# Patient Record
Sex: MALE | Race: WHITE | Employment: OTHER | URBAN - METROPOLITAN AREA
[De-identification: names, ages, dates, MRNs, and addresses within clinical notes are randomized per-mention and may not be internally consistent; named-entity substitution may affect disease eponyms.]

---

## 2017-08-17 PROCEDURE — 81001 URINALYSIS AUTO W/SCOPE: CPT | Performed by: FAMILY MEDICINE

## 2017-08-17 PROCEDURE — 84154 ASSAY OF PSA FREE: CPT | Performed by: FAMILY MEDICINE

## 2017-08-17 PROCEDURE — 87086 URINE CULTURE/COLONY COUNT: CPT | Performed by: FAMILY MEDICINE

## 2017-08-17 PROCEDURE — 84153 ASSAY OF PSA TOTAL: CPT | Performed by: FAMILY MEDICINE

## 2017-08-29 PROBLEM — M51.27 LUMBOSACRAL DISC HERNIATION: Status: ACTIVE | Noted: 2017-08-29

## 2019-04-16 PROBLEM — Z28.21 REFUSED INFLUENZA VACCINE: Status: ACTIVE | Noted: 2019-04-16

## 2019-04-16 PROBLEM — Z28.21 REFUSED PNEUMOCOCCAL VACCINATION: Status: ACTIVE | Noted: 2019-04-16

## 2020-04-08 ENCOUNTER — APPOINTMENT (OUTPATIENT)
Dept: MRI IMAGING | Facility: HOSPITAL | Age: 80
End: 2020-04-08
Attending: EMERGENCY MEDICINE
Payer: MEDICARE

## 2020-04-08 ENCOUNTER — HOSPITAL ENCOUNTER (EMERGENCY)
Facility: HOSPITAL | Age: 80
Discharge: HOME | End: 2020-04-08
Attending: EMERGENCY MEDICINE
Payer: MEDICARE

## 2020-04-08 VITALS
SYSTOLIC BLOOD PRESSURE: 129 MMHG | HEIGHT: 69 IN | TEMPERATURE: 98 F | HEART RATE: 58 BPM | WEIGHT: 155 LBS | BODY MASS INDEX: 22.96 KG/M2 | DIASTOLIC BLOOD PRESSURE: 71 MMHG | RESPIRATION RATE: 16 BRPM | OXYGEN SATURATION: 96 %

## 2020-04-08 DIAGNOSIS — H81.399 PERIPHERAL VERTIGO, UNSPECIFIED LATERALITY: Primary | ICD-10-CM

## 2020-04-08 PROCEDURE — 85025 COMPLETE CBC W/AUTO DIFF WBC: CPT | Performed by: EMERGENCY MEDICINE

## 2020-04-08 PROCEDURE — 36415 COLL VENOUS BLD VENIPUNCTURE: CPT

## 2020-04-08 PROCEDURE — 70551 MRI BRAIN STEM W/O DYE: CPT | Performed by: EMERGENCY MEDICINE

## 2020-04-08 PROCEDURE — 93005 ELECTROCARDIOGRAM TRACING: CPT

## 2020-04-08 PROCEDURE — 80048 BASIC METABOLIC PNL TOTAL CA: CPT | Performed by: EMERGENCY MEDICINE

## 2020-04-08 PROCEDURE — 93010 ELECTROCARDIOGRAM REPORT: CPT | Performed by: EMERGENCY MEDICINE

## 2020-04-08 PROCEDURE — 84484 ASSAY OF TROPONIN QUANT: CPT | Performed by: EMERGENCY MEDICINE

## 2020-04-08 PROCEDURE — 99284 EMERGENCY DEPT VISIT MOD MDM: CPT

## 2020-04-08 RX ORDER — ONDANSETRON 4 MG/1
4 TABLET, ORALLY DISINTEGRATING ORAL EVERY 4 HOURS PRN
Qty: 10 TABLET | Refills: 0 | Status: SHIPPED | OUTPATIENT
Start: 2020-04-08 | End: 2020-04-08

## 2020-04-08 RX ORDER — MECLIZINE HYDROCHLORIDE 25 MG/1
25 TABLET ORAL ONCE
Status: COMPLETED | OUTPATIENT
Start: 2020-04-08 | End: 2020-04-08

## 2020-04-08 RX ORDER — MECLIZINE HYDROCHLORIDE 25 MG/1
25 TABLET ORAL 3 TIMES DAILY PRN
Qty: 10 TABLET | Refills: 0 | Status: SHIPPED | OUTPATIENT
Start: 2020-04-08 | End: 2020-04-15

## 2020-04-08 RX ORDER — ONDANSETRON 4 MG/1
4 TABLET, ORALLY DISINTEGRATING ORAL ONCE
Status: COMPLETED | OUTPATIENT
Start: 2020-04-08 | End: 2020-04-08

## 2020-04-08 RX ORDER — MECLIZINE HYDROCHLORIDE 25 MG/1
25 TABLET ORAL 3 TIMES DAILY PRN
Qty: 10 TABLET | Refills: 0 | Status: SHIPPED | OUTPATIENT
Start: 2020-04-08 | End: 2020-04-08

## 2020-04-08 RX ORDER — ONDANSETRON 4 MG/1
4 TABLET, FILM COATED ORAL ONCE
Status: DISCONTINUED | OUTPATIENT
Start: 2020-04-08 | End: 2020-04-08

## 2020-04-08 RX ORDER — ONDANSETRON 4 MG/1
4 TABLET, ORALLY DISINTEGRATING ORAL EVERY 4 HOURS PRN
Qty: 10 TABLET | Refills: 0 | Status: SHIPPED | OUTPATIENT
Start: 2020-04-08 | End: 2020-04-15

## 2020-04-08 NOTE — ED PROVIDER NOTES
Patient Seen in: [facility] AND [facility] Emergency Department      History   Patient presents with:  Dizziness    Stated Complaint: dizziness    HPI  39-year-old male with hypertension presenting for evaluation of dizziness.   He has had 2 episodes of the dizzi Tympanic membrane normal.      Left Ear: Tympanic membrane normal.      Nose: Nose normal.      Mouth/Throat:      Mouth: Mucous membranes are moist.      Pharynx: Oropharynx is clear. Neck:      Musculoskeletal: Normal range of motion.    Cardiovascular: axes as noted on EKG Report.   Rate: 64  Rhythm: Sinus Rhythm  Reading: no STEMI, RBBB present with LAFB, no prior for comparison              Mri Brain Wo Acute (3) Sequence (cpt=70551)    Result Date: 4/8/2020  CONCLUSION:  Limited 3-sequence stroke dell (four) hours as needed for Nausea.   Qty: 10 tablet Refills: 0

## 2020-04-08 NOTE — ED INITIAL ASSESSMENT (HPI)
Patient here for dizziness that began yesterday, lessened throughout the day, but came back again [place] morning., States he feels as if he is off balance. Patient alert and oriented with good facial symmetry and hand grasp.  Denies CP or SOB

## 2020-09-11 ENCOUNTER — ORDER TRANSCRIPTION (OUTPATIENT)
Dept: ADMINISTRATIVE | Facility: HOSPITAL | Age: 80
End: 2020-09-11

## 2020-09-11 DIAGNOSIS — R29.898 WEAKNESS OF BOTH UPPER EXTREMITIES: Primary | ICD-10-CM

## 2020-09-29 ENCOUNTER — OFFICE VISIT (OUTPATIENT)
Dept: ELECTROPHYSIOLOGY | Facility: HOSPITAL | Age: 80
End: 2020-09-29
Attending: ORTHOPAEDIC SURGERY
Payer: MEDICARE

## 2020-09-29 DIAGNOSIS — R29.898 WEAKNESS OF BOTH UPPER EXTREMITIES: ICD-10-CM

## 2020-09-29 PROCEDURE — 95912 NRV CNDJ TEST 11-12 STUDIES: CPT | Performed by: OTHER

## 2020-09-29 PROCEDURE — 95885 MUSC TST DONE W/NERV TST LIM: CPT | Performed by: OTHER

## 2020-09-29 PROCEDURE — 95886 MUSC TEST DONE W/N TEST COMP: CPT | Performed by: OTHER

## 2020-09-29 NOTE — PROCEDURES
[employer]  Nerve Conduction & EMG Report                      Brianna Mujica, [address]   EMG department   [facility] [address]  [place] [address] [place] [address]  [phone]          Patient name: Rosa Morrison  Date of bir any radiculopathies. 4.  There is no clear evidence of a myopathy. 5.  There is no clear evidence of a motor neuronopathy at this time. 6.  A neurology consultation is recommended, as well as consideration of repeating a NCV/EMG in 2 to 4 months time.

## 2021-04-06 ENCOUNTER — OFFICE VISIT (OUTPATIENT)
Dept: AUDIOLOGY | Facility: CLINIC | Age: 81
End: 2021-04-06
Payer: COMMERCIAL

## 2021-04-06 ENCOUNTER — OFFICE VISIT (OUTPATIENT)
Dept: OTOLARYNGOLOGY | Facility: CLINIC | Age: 81
End: 2021-04-06
Payer: COMMERCIAL

## 2021-04-06 VITALS — HEIGHT: 68 IN | BODY MASS INDEX: 22.73 KG/M2 | WEIGHT: 150 LBS

## 2021-04-06 DIAGNOSIS — H91.93 BILATERAL HEARING LOSS, UNSPECIFIED HEARING LOSS TYPE: Primary | ICD-10-CM

## 2021-04-06 DIAGNOSIS — H90.A31 MIXED CONDUCTIVE AND SENSORINEURAL HEARING LOSS OF RIGHT EAR WITH RESTRICTED HEARING OF LEFT EAR: Primary | ICD-10-CM

## 2021-04-06 PROCEDURE — 92567 TYMPANOMETRY: CPT | Performed by: AUDIOLOGIST

## 2021-04-06 PROCEDURE — 99203 OFFICE O/P NEW LOW 30 MIN: CPT | Performed by: OTOLARYNGOLOGY

## 2021-04-06 PROCEDURE — 3008F BODY MASS INDEX DOCD: CPT | Performed by: OTOLARYNGOLOGY

## 2021-04-06 PROCEDURE — 92557 COMPREHENSIVE HEARING TEST: CPT | Performed by: AUDIOLOGIST

## 2021-04-06 NOTE — PROGRESS NOTES
AUDIOLOGY REPORT      Shila Mayfield is a 79year old male     Referring Provider: Elaina Cobos   YOB: 1940  Medical Record: [id number]      Patient Hearing History:  Patient reported hearing loss, greater for right ear.    He does not

## 2021-04-08 NOTE — PROGRESS NOTES
Marissa Katz is a 79year old male. Patient presents with:  Hearing Loss: right ear is worse     HPI:   He has been experiencing hearing loss in both of his ears.   This is been going on for the last several years but seems to be getting slowly worse Normal. Cranial nerves - Cranial nerves II through XII grossly intact.    Neck Exam Normal Inspection - Normal. Palpation - Normal. Parotid gland - Normal. Thyroid gland - Normal.   Psychiatric Normal Orientation - Oriented to time, place, person & situatio

## 2021-04-14 PROBLEM — Z28.21 REFUSED PNEUMOCOCCAL VACCINATION: Status: RESOLVED | Noted: 2019-04-16 | Resolved: 2021-04-14

## 2021-04-14 PROBLEM — H35.3131 EARLY STAGE DRY AGE-RELATED MACULAR DEGENERATION OF BOTH EYES: Status: ACTIVE | Noted: 2021-04-14

## 2021-04-14 PROBLEM — H91.93 BILATERAL HEARING LOSS: Status: ACTIVE | Noted: 2021-04-14

## 2021-04-14 PROBLEM — I67.89 CEREBRAL MICROVASCULAR DISEASE: Status: ACTIVE | Noted: 2021-04-14

## 2021-04-14 PROBLEM — M35.3 PMR (POLYMYALGIA RHEUMATICA) (HCC): Status: ACTIVE | Noted: 2021-04-14

## 2021-04-14 PROBLEM — Z28.21 REFUSED INFLUENZA VACCINE: Status: RESOLVED | Noted: 2019-04-16 | Resolved: 2021-04-14

## 2024-08-20 ENCOUNTER — OFFICE VISIT (OUTPATIENT)
Dept: AUDIOLOGY | Facility: CLINIC | Age: 84
End: 2024-08-20
Payer: MEDICARE

## 2024-08-20 ENCOUNTER — OFFICE VISIT (OUTPATIENT)
Dept: OTOLARYNGOLOGY | Facility: CLINIC | Age: 84
End: 2024-08-20
Payer: MEDICARE

## 2024-08-20 DIAGNOSIS — H91.90 HEARING LOSS, UNSPECIFIED HEARING LOSS TYPE, UNSPECIFIED LATERALITY: Primary | ICD-10-CM

## 2024-08-20 DIAGNOSIS — H61.20 CERUMINOSIS, UNSPECIFIED LATERALITY: Primary | ICD-10-CM

## 2024-08-20 DIAGNOSIS — H61.23 BILATERAL IMPACTED CERUMEN: ICD-10-CM

## 2024-08-20 DIAGNOSIS — H90.6 MIXED HEARING LOSS, BILATERAL: ICD-10-CM

## 2024-08-20 PROCEDURE — 69210 REMOVE IMPACTED EAR WAX UNI: CPT | Performed by: OTOLARYNGOLOGY

## 2024-08-20 PROCEDURE — 92567 TYMPANOMETRY: CPT | Performed by: AUDIOLOGIST

## 2024-08-20 PROCEDURE — 92557 COMPREHENSIVE HEARING TEST: CPT | Performed by: AUDIOLOGIST

## 2024-08-20 PROCEDURE — 99203 OFFICE O/P NEW LOW 30 MIN: CPT | Performed by: OTOLARYNGOLOGY

## 2024-08-20 RX ORDER — LOSARTAN POTASSIUM 100 MG/1
100 TABLET ORAL DAILY
COMMUNITY
Start: 2024-07-03

## 2024-08-20 RX ORDER — ALBUTEROL SULFATE 90 UG/1
2 AEROSOL, METERED RESPIRATORY (INHALATION) EVERY 6 HOURS PRN
COMMUNITY
Start: 2023-11-07

## 2024-08-20 RX ORDER — CALCIUM CARBONATE/VITAMIN D3 600 MG-10
1 TABLET ORAL DAILY
COMMUNITY

## 2024-08-20 NOTE — PROGRESS NOTES
Leo López is a 83 year old male.    Chief Complaint   Patient presents with    Ear Wax     Ear cleaning     Hearing Loss     Patient is here due to hearing loss x 3 years.       HISTORY OF PRESENT ILLNESS  He presents today with complaints of hearing loss for the past 3 years.  Seen by Dr. NIX back in  and noted to have asymmetric mixed hearing loss at that time.  He feels that his hearing has worsened.  His wife is present and feels the same.  Ears were cleaned of cerumen impaction bilaterally using microscopy documentation and subsequent audiogram was performed demonstrating slight worsening of his hearing particularly in the right ear compared to the left ear.  Conductive component is greater on the right than the left.  Moderate hearing loss bilaterally downsloping to near severe hearing loss on the left and severe hearing loss on the right.  Otosclerosis?  Normal tympanograms and speechand scores of percent.  Here to discuss further management.      Social History     Socioeconomic History    Marital status:    Tobacco Use    Smoking status: Never    Smokeless tobacco: Never   Substance and Sexual Activity    Alcohol use: Yes     Alcohol/week: 0.0 standard drinks of alcohol     Comment: beer    Drug use: No       Family History   Problem Relation Age of Onset    Cancer Father         bone cancer (mets)      Cancer Mother         colon cancer  -         Past Medical History:    Bilateral hearing loss    Cerebral microvascular disease    Early stage dry age-related macular degeneration of both eyes    Elevated PSA    Essential hypertension    PMR (polymyalgia rheumatica) (HCC)       Past Surgical History:   Procedure Laterality Date    Appendectomy      intussusception          REVIEW OF SYSTEMS    System Neg/Pos Details   Constitutional Negative Fatigue, fever and weight loss.   ENMT Negative Drooling.   Eyes Negative Blurred vision and vision changes.   Respiratory Negative Dyspnea and  wheezing.   Cardio Negative Chest pain, irregular heartbeat/palpitations and syncope.   GI Negative Abdominal pain and diarrhea.   Endocrine Negative Cold intolerance and heat intolerance.   Neuro Negative Tremors.   Psych Negative Anxiety and depression.   Integumentary Negative Frequent skin infections, pigment change and rash.   Hema/Lymph Negative Easy bleeding and easy bruising.           PHYSICAL EXAM    There were no vitals taken for this visit.       Constitutional Normal Overall appearance - Normal.   Psychiatric Normal Orientation - Oriented to time, place, person & situation. Appropriate mood and affect.   Neck Exam Normal Inspection - Normal. Palpation - Normal. Parotid gland - Normal. Thyroid gland - Normal.   Eyes Normal Conjunctiva - Right: Normal, Left: Normal. Pupil - Right: Normal, Left: Normal. Fundus - Right: Normal, Left: Normal.   Neurological Normal Memory - Normal. Cranial nerves - Cranial nerves II through XII grossly intact.   Head/Face Normal Facial features - Normal. Eyebrows - Normal. Skull - Normal.        Nasopharynx Normal External nose - Normal. Lips/teeth/gums - Normal. Tonsils - Normal. Oropharynx - Normal.   Ears Normal Inspection - Right: Normal, Left: Normal. Canal - Right: Normal, Left: Normal. TM - Right: Normal, Left: Normal.   Skin Normal Inspection - Normal.        Lymph Detail Normal Submental. Submandibular. Anterior cervical. Posterior cervical. Supraclavicular.        Nose/Mouth/Throat Normal External nose - Normal. Lips/teeth/gums - Normal. Tonsils - Normal. Oropharynx - Normal.   Nose/Mouth/Throat Normal Nares - Right: Normal Left: Normal. Septum -Normal  Turbinates - Right: Normal, Left: Normal.   Microscopy  Binocular microscopy was performed. The affected ear(s) was/were examined and all debris removed using suction. The findings are described in the physical exam.   Ears cleaned of cerumen impaction bilaterally using microscopy and instrumentation.  Normal  examination bilaterally    Current Outpatient Medications:     albuterol 108 (90 Base) MCG/ACT Inhalation Aero Soln, Inhale 2 puffs into the lungs every 6 (six) hours as needed., Disp: , Rfl:     Calcium Carb-Cholecalciferol 600-10 MG-MCG Oral Tab, Take 1 tablet by mouth daily., Disp: , Rfl:     losartan 100 MG Oral Tab, Take 1 tablet (100 mg total) by mouth daily., Disp: , Rfl:     Loratadine (CLARITIN OR), , Disp: , Rfl:     predniSONE 1 MG Oral Tab, Take 1 tablet (1 mg total) by mouth daily with breakfast., Disp: 90 tablet, Rfl: 0    lisinopril 40 MG Oral Tab, Take 1 tablet (40 mg total) by mouth daily., Disp: 90 tablet, Rfl: 2    Calcium Carbonate-Vitamin D (CALCIUM + D OR), Take by mouth., Disp: , Rfl:     Multiple Vitamins-Minerals (CENTRUM SILVER) Oral Tab, Take 1 tablet by mouth daily., Disp: , Rfl:     Fiber Oral Powder, Take by mouth., Disp: , Rfl:     ALPRAZolam 1 MG Oral Tab, Take one tab prior to mri or other testing and may repeat x1 if still anxious., Disp: 10 tablet, Rfl: 0    ALEVE OR, prn, Disp: , Rfl:     amLODIPine 5 MG Oral Tab, Take 1 tablet (5 mg total) by mouth daily., Disp: 90 tablet, Rfl: 1  ASSESSMENT AND PLAN    1. Hearing loss, unspecified hearing loss type, unspecified laterality  - Audiology Referral - [facility])    2. Bilateral impacted cerumen  Ears cleaned of cerumen impaction bilaterally.  He may require routine ear cleanings in the future.  Audiogram was demonstrated to be fairly similar to his previous study from 2021.  We did review both studies in the office today.  He does have bilateral mixed hearing loss with the right being slightly worse than the left.  I did recommend that he consider amplification in both ears.  Speech discrimination scores of about 80 to 85% bilaterally normal tympanograms.  Otosclerosis?  Return to see me in 1 year with repeat audio I did recommend that they contact her insurance to see what type of hearing aid benefits are available  to them.        This note was prepared using Dragon Medical voice recognition dictation software. As a result errors may occur. When identified these errors have been corrected. While every attempt is made to correct errors during dictation discrepancies may still exist    Andrade England MD    8/20/2024    4:59 PM

## 2024-11-14 DIAGNOSIS — H90.6 MIXED HEARING LOSS, BILATERAL: Primary | ICD-10-CM

## 2024-11-19 ENCOUNTER — OFFICE VISIT (OUTPATIENT)
Dept: AUDIOLOGY | Facility: CLINIC | Age: 84
End: 2024-11-19
Payer: MEDICARE

## 2024-11-19 DIAGNOSIS — H90.A31 MIXED CONDUCTIVE AND SENSORINEURAL HEARING LOSS OF RIGHT EAR WITH RESTRICTED HEARING OF LEFT EAR: Primary | ICD-10-CM

## 2024-11-19 PROCEDURE — 92591 HEARING AID EXAM, BOTH EARS: CPT | Performed by: AUDIOLOGIST

## 2024-11-19 NOTE — PROGRESS NOTES
HEARING AID EVALUATION    Leo López is a 84 year old male     Referring Provider: No ref. provider found   YOB: 1940  Medical Record: [id number]    Hearing Aid Prescription Date of Issue:  11-  Hearing Aid Prescription Date of Expiration:  8-  (One year from date of audiometric testing)     Patient History of Hearing Loss:   Patient is here with wife for hearing aid evaluation.   Patient's wife reports that he is having difficulty following conversations and that he is missing a lot of average conversation.    Patient describes a fairly quiet lifestyle with some larger family groups at times.       Test Results:   See audiogram for air and bone conduction thresholds and recorded speech in quiet.  Recorded speech testing completed today at 85dBHL in right ear, yielding score of 92%.   Testing for left ear at 75dBHL yielded score of 84%.       Quick SIN Speech in Noise Test presented binaurally through insert earphones yielded score of  9.5HL SNR loss  The following table  shows scoring for this test and gives an indication of how well this person would be expected to hear in a background of noise.    0 - 3 dB SNR-Loss              Normal  4 - 7 dB SNR-Loss              Mild  8 - 15 dB SNR Loss           Moderate  >15 dB SNR Loss               Severe    Hearing Handicap Inventory Screening Version (HHIE-S)  Total Score= 26  (0-8 no handicap, 10-24 mild-mod handicap, 26-40 severe handicap)    Hearing Aid Selection:    Based on the audiometric test results, patient perception of hearing difficulty, and patient lifestyle the following hearing aid(s) and features are recommended as medically necessary to improve the patient's ability to hear speech sounds in a variety of listening environments.   The use of hearing aids will enhance the patient's quality of life and improve the ability to maintain social contacts with work, family or friends.      Type/Style of hearing aid  recommended:     BUD ( in canal with custom micromold/c-shell)        Recommended Features of hearing aid:    Rechargeable      Bluetooth Connectivity briefly discussed this feature.  Not sure if he will want to use it or not.   He did request that we keep things simple.   Noise reduction to improve speech perception in noisy environments  Dual Microphones to improve hearing speech from different directions  Active Feedback management  Frequency Lowering Capabilities to improve speech perception with severe high-frequency hearing loss  Echoblock/Windblock technology to reduce chong noise and reverberation in certain environments  Tinnitus Masking capabilities  Automatic steering between listening environments.         Hearing aid charges and policies were discussed with patient including warranty information, trial period with amplification, benefits/limitations of current technology.  Realistic expectations and acclimating to new sounds was also discussed.          Hearing Aid Order:   : Phonak  Model:  Audeo P25-Qatbuf  Color Choice:  P5 Champagne  Wire length right #3  Wire length left #3   Power right: M   Power left M  Technology Level Choice:  Option 2    Patient has [employer] insurance benefit that is reimbursement only.    Hearing aid fitting appt set for Dec 6th.       Audiologist:  Lauren Ace  Audiology [place] License Number: [id number]

## 2024-12-06 ENCOUNTER — OFFICE VISIT (OUTPATIENT)
Dept: AUDIOLOGY | Facility: CLINIC | Age: 84
End: 2024-12-06

## 2024-12-06 DIAGNOSIS — H90.6 MIXED CONDUCTIVE AND SENSORINEURAL HEARING LOSS OF BOTH EARS: Primary | ICD-10-CM

## 2024-12-06 PROCEDURE — V5261 HEARING AID, DIGIT, BIN, BTE: HCPCS | Performed by: AUDIOLOGIST

## 2024-12-06 NOTE — PROGRESS NOTES
Leo López is a 84 year old male     Referring Provider: No ref. provider found   YOB: 1940  Medical Record: [id number]    Hearing Aid Fitting                        Hearing Aid Information  Date of Dispensin2024  Make: Phonak      Model:  Francieo L23-Prhtuf   Right Serial # [id number]  Left Serial # [id number]  Color:  P5 Champagne       Power:  M  Wire Length: #3  Custom c-shell Right: # [id number]  Custom c-shell Left:  #[id number]  Battery: Rechargeable Li-Ion  Wax System:  CeruStop     Accessory: Phonak ChargerGO [facility] #[id number]    Real Ear Measurements (REM) were completed today and compared with NAL-N2 targets.   Results show a good match at soft, average and loud inputs.   Demonstrated use and care including wax guards Cerustops.   Patient able to insert/remove aids with some coaching.   Patient able to change wax guard in office.    Did not pair to phone at this time.         The following items were demonstrated to the patient:  Insertion/removal of hearing aid into ear  Use of batteries and/or  unit  Function and operation of controls  Telephone use  Cleaning of hearing aid/earmold  Storage of hearing aids    The patient was informed of or received the following:  Adjustment period and realistic expectations  Cleaning tools  Wax Guards  Hearing aid instruction book   Carrying case  Purchase agreement  Repair/loss coverage:  3 years  Trial period/return policy:  45 days from dispensing date  Service period:  6 months from dispensing date.    Office visit charges will apply after initial service period.     Patient expressed understanding to all discussed topics.  Follow-up scheduled for 2 weeks.      24  Luke Warner

## 2024-12-20 ENCOUNTER — OFFICE VISIT (OUTPATIENT)
Dept: AUDIOLOGY | Facility: CLINIC | Age: 84
End: 2024-12-20
Payer: MEDICARE

## 2024-12-20 DIAGNOSIS — H90.6 MIXED CONDUCTIVE AND SENSORINEURAL HEARING LOSS OF BOTH EARS: Primary | ICD-10-CM

## 2024-12-20 DIAGNOSIS — H90.3 SENSORINEURAL HEARING LOSS (SNHL) OF BOTH EARS: ICD-10-CM

## 2024-12-20 PROCEDURE — 92593 HEARING AID CHECK, BOTH EARS: CPT | Performed by: AUDIOLOGIST

## 2024-12-20 NOTE — PROGRESS NOTES
HEARING AID FOLLOW-UP    Leo López  10/28/1940  [id number]    Patient is here for first follow up visit after hearing aid fitting two weeks ago.                          Hearing Aid Information  Date of Dispensin2024  Make: Phonak      Model:  Audeo P22-Xnbbuo   Right Serial # [id number]  Left Serial # [id number]  Color:  P5 Champagne       Power:  M  Wire Length: #3  Custom c-shell Right: # [id number]  Custom c-shell Left:  #[id number]  Battery: Rechargeable Li-Ion  Wax System:  CeruStop     Accessory: Phonak ChargerGO [facility] #[id number]      The patient has the following concerns:   Overall is doing well.   States his family is pleased.   Sometimes he takes them out just because they give him so much sound that he doesn't really need.     In office the following actions were taken:  Datalogging shows 7-8 hours avg daily use.   No program changes made today.     Reviewed how to change wax guards and gave a few more packages at no charge.   Encouraged to wear full time for best and fastest way to acclimatize to aids.     Patient is to follow up in April for another follow up while still under six month service agreement.     2024  Luke Warner

## 2025-04-09 ENCOUNTER — OFFICE VISIT (OUTPATIENT)
Dept: AUDIOLOGY | Facility: CLINIC | Age: 85
End: 2025-04-09
Payer: MEDICARE

## 2025-04-09 DIAGNOSIS — H90.6 MIXED CONDUCTIVE AND SENSORINEURAL HEARING LOSS OF BOTH EARS: Primary | ICD-10-CM

## 2025-04-09 PROCEDURE — 92593 HEARING AID CHECK, BOTH EARS: CPT | Performed by: AUDIOLOGIST

## 2025-04-09 NOTE — PROGRESS NOTES
HEARING AID FOLLOW-UP    Leo López  10/28/1940  [id number]    Patient is here for four month follow up after new hearing aid fitting.                        Hearing Aid Information  Date of Dispensin2024  Make: Phonak      Model:  Francieo Q49-Yjnovk   Right Serial # [id number]  Left Serial # [id number]  Color:  P5 Champagne       Power:  M  Wire Length: #3  Custom c-shell Right: # [id number]  Custom c-shell Left:  #[id number]  Battery: Rechargeable Li-Ion  Wax System:  CeruStop     Accessory: Phonak [employer]gerGO [facility] #[id number]    The patient has the following concerns:   Feels like he hears just as well with or without the right aid.   Needs review of several factors of hearing aid use.     In office the following actions were taken:  Found right wax guard plugged and left wax guard nearly plugged.     Patient has difficulty seeing this, so reviewed changing them once a month or sooner if he is questioning his hearing aid benefit.   Showed him how to listen to chong noise with finger across top of aid to help gauge if wax guard is plugged or not.    Patient able to change in office without difficulty.     Reviewed how to clean vent with vent cleaning tool and showed him how to do this.       Reviewed that he should brush chong openings on hearing aid with small brush on regular basis.     Reviewed how to change volume and patient able to do so.   Reminded that changing one VC really changes both.     Datalogging shows only 3.5 hours avg daily use.   Encouraged that he should be wearing full time at least 8-10 hours per day.   Patient does not see need to wear if he is by himself.   Reviewed that he needs to re-calibrate his brain for all the normal sounds that he objects to (furnace, road noise, etc).       Encouraged to wear full time and scheduled one more follow up under the initial six month service agreement   Patient is to follow up .     2025  Luke Warner

## 2025-06-18 ENCOUNTER — OFFICE VISIT (OUTPATIENT)
Dept: AUDIOLOGY | Facility: CLINIC | Age: 85
End: 2025-06-18
Payer: MEDICARE

## 2025-06-18 DIAGNOSIS — H90.3 SENSORINEURAL HEARING LOSS (SNHL) OF BOTH EARS: Primary | ICD-10-CM

## 2025-06-18 PROCEDURE — 92593 HEARING AID CHECK, BOTH EARS: CPT | Performed by: AUDIOLOGIST

## 2025-06-18 NOTE — PROGRESS NOTES
HEARING AID FOLLOW-UP    Leo López  10/28/1940  [id number]    Patient is here for hearing aid follow up approximately 6 months after fitting.                         Hearing Aid Information  Date of Dispensin2024  Make: Phonak      Model:  Audeo C76-Ipopkh   Right Serial # [id number]  Left Serial # [id number]  Color:  P5 Champagne       Power:  M  Wire Length: #3  Custom c-shell Right: # [id number]  Custom c-shell Left:  #[id number]  Battery: Rechargeable Li-Ion  Wax System:  CeruStop     Accessory: Phonak [employer]gerGO [facility] #[id number]    The patient has the following concerns:   Has trouble adjusting volume.   Still does not wear full time and sees no need for aids when he is home alone.     In office the following actions were taken:  Attempted to have him download myPhonak to his apple phone, but ran into problems with password.    Gave him info on mustapha and Phonak consumer hotline number for help with pairing.   Patient has grandson who may be able to help him with this.     Reviewed use and care.   No program changes made.   Datalogging improved from 3 hours/day to approximately 6 hours/day after last visit.     Patient is to follow up annually or prn.     2025  Luke Warner

## (undated) NOTE — MR AVS SNAPSHOT
After Visit Summary   9/29/2020    Alli Lemons    MRN: [id number]           Visit Information     Date & Time  9/29/2020  2:00 PM Provider  Corinne Horseman, [address] Department [facility] Neurodiagnostics Dept.  Phone  [phone]      Al NIRAV using your mobile device or computer   using [address] with a [facility] Physician or NIRAV online. The physician will respond and provide   a treatment plan within a few hours.  ONLINE VISIT  Primary Care Providers  Treatment for mild il